# Patient Record
Sex: MALE | Race: WHITE | Employment: STUDENT | URBAN - METROPOLITAN AREA
[De-identification: names, ages, dates, MRNs, and addresses within clinical notes are randomized per-mention and may not be internally consistent; named-entity substitution may affect disease eponyms.]

---
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
## 2021-12-20 ENCOUNTER — TELEMEDICINE (OUTPATIENT)
Dept: FAMILY MEDICINE CLINIC | Facility: CLINIC | Age: 21
End: 2021-12-20

## 2021-12-20 DIAGNOSIS — Z53.29 NO-SHOW FOR APPOINTMENT: Primary | ICD-10-CM

## 2021-12-20 NOTE — PROGRESS NOTES
I was running late this morning. I texted a link to the phone number listed in his chart at 11:50 AM.  I did not get a connection. I now realized that the schedule has a different number, [phone].   I tried calling this number to get in touch with Diane Cunningham

## 2022-05-31 ENCOUNTER — EKG ENCOUNTER (OUTPATIENT)
Dept: LAB | Age: 22
End: 2022-05-31
Attending: INTERNAL MEDICINE
Payer: COMMERCIAL

## 2022-05-31 ENCOUNTER — LAB ENCOUNTER (OUTPATIENT)
Dept: LAB | Age: 22
End: 2022-05-31
Attending: INTERNAL MEDICINE
Payer: COMMERCIAL

## 2022-05-31 ENCOUNTER — OFFICE VISIT (OUTPATIENT)
Dept: FAMILY MEDICINE CLINIC | Facility: CLINIC | Age: 22
End: 2022-05-31
Payer: COMMERCIAL

## 2022-05-31 VITALS
WEIGHT: 184 LBS | HEIGHT: 70 IN | BODY MASS INDEX: 26.34 KG/M2 | OXYGEN SATURATION: 98 % | RESPIRATION RATE: 20 BRPM | DIASTOLIC BLOOD PRESSURE: 70 MMHG | SYSTOLIC BLOOD PRESSURE: 112 MMHG | HEART RATE: 72 BPM

## 2022-05-31 DIAGNOSIS — Z00.00 ROUTINE GENERAL MEDICAL EXAMINATION AT A HEALTH CARE FACILITY: ICD-10-CM

## 2022-05-31 DIAGNOSIS — Z00.00 ROUTINE GENERAL MEDICAL EXAMINATION AT A HEALTH CARE FACILITY: Primary | ICD-10-CM

## 2022-05-31 DIAGNOSIS — R53.83 DECREASED STAMINA: ICD-10-CM

## 2022-05-31 LAB
ALBUMIN SERPL-MCNC: 4.3 G/DL (ref 3.4–5)
ALBUMIN/GLOB SERPL: 1.1 {RATIO} (ref 1–2)
ALP LIVER SERPL-CCNC: 80 U/L
ALT SERPL-CCNC: 22 U/L
ANION GAP SERPL CALC-SCNC: 5 MMOL/L (ref 0–18)
AST SERPL-CCNC: 15 U/L (ref 15–37)
ATRIAL RATE: 68 BPM
BASOPHILS # BLD AUTO: 0.06 X10(3) UL (ref 0–0.2)
BASOPHILS NFR BLD AUTO: 0.7 %
BILIRUB SERPL-MCNC: 0.5 MG/DL (ref 0.1–2)
BUN BLD-MCNC: 13 MG/DL (ref 7–18)
CALCIUM BLD-MCNC: 8.9 MG/DL (ref 8.5–10.1)
CHLORIDE SERPL-SCNC: 107 MMOL/L (ref 98–112)
CHOLEST SERPL-MCNC: 182 MG/DL (ref ?–200)
CO2 SERPL-SCNC: 26 MMOL/L (ref 21–32)
CREAT BLD-MCNC: 0.96 MG/DL
EOSINOPHIL # BLD AUTO: 0.19 X10(3) UL (ref 0–0.7)
EOSINOPHIL NFR BLD AUTO: 2.2 %
ERYTHROCYTE [DISTWIDTH] IN BLOOD BY AUTOMATED COUNT: 12.6 %
FASTING PATIENT LIPID ANSWER: NO
FASTING STATUS PATIENT QL REPORTED: NO
GLOBULIN PLAS-MCNC: 3.9 G/DL (ref 2.8–4.4)
GLUCOSE BLD-MCNC: 86 MG/DL (ref 70–99)
HCT VFR BLD AUTO: 42.5 %
HDLC SERPL-MCNC: 54 MG/DL (ref 40–59)
HGB BLD-MCNC: 14.2 G/DL
IMM GRANULOCYTES # BLD AUTO: 0.02 X10(3) UL (ref 0–1)
IMM GRANULOCYTES NFR BLD: 0.2 %
IRON SATN MFR SERPL: 30 %
IRON SERPL-MCNC: 122 UG/DL
LDLC SERPL CALC-MCNC: 105 MG/DL (ref ?–100)
LYMPHOCYTES # BLD AUTO: 1.9 X10(3) UL (ref 1–4)
LYMPHOCYTES NFR BLD AUTO: 22 %
MCH RBC QN AUTO: 29.1 PG (ref 26–34)
MCHC RBC AUTO-ENTMCNC: 33.4 G/DL (ref 31–37)
MCV RBC AUTO: 87.1 FL
MONOCYTES # BLD AUTO: 0.68 X10(3) UL (ref 0.1–1)
MONOCYTES NFR BLD AUTO: 7.9 %
NEUTROPHILS # BLD AUTO: 5.78 X10 (3) UL (ref 1.5–7.7)
NEUTROPHILS # BLD AUTO: 5.78 X10(3) UL (ref 1.5–7.7)
NEUTROPHILS NFR BLD AUTO: 67 %
NONHDLC SERPL-MCNC: 128 MG/DL (ref ?–130)
OSMOLALITY SERPL CALC.SUM OF ELEC: 285 MOSM/KG (ref 275–295)
P AXIS: 22 DEGREES
P-R INTERVAL: 152 MS
PLATELET # BLD AUTO: 412 10(3)UL (ref 150–450)
POTASSIUM SERPL-SCNC: 3.7 MMOL/L (ref 3.5–5.1)
PROT SERPL-MCNC: 8.2 G/DL (ref 6.4–8.2)
Q-T INTERVAL: 372 MS
QRS DURATION: 86 MS
QTC CALCULATION (BEZET): 395 MS
R AXIS: 71 DEGREES
RBC # BLD AUTO: 4.88 X10(6)UL
SODIUM SERPL-SCNC: 138 MMOL/L (ref 136–145)
T AXIS: 49 DEGREES
TIBC SERPL-MCNC: 402 UG/DL (ref 240–450)
TRANSFERRIN SERPL-MCNC: 270 MG/DL (ref 200–360)
TRIGL SERPL-MCNC: 128 MG/DL (ref 30–149)
TSI SER-ACNC: 0.64 MIU/ML (ref 0.36–3.74)
VENTRICULAR RATE: 68 BPM
VLDLC SERPL CALC-MCNC: 22 MG/DL (ref 0–30)
WBC # BLD AUTO: 8.6 X10(3) UL (ref 4–11)

## 2022-05-31 PROCEDURE — 99385 PREV VISIT NEW AGE 18-39: CPT | Performed by: INTERNAL MEDICINE

## 2022-05-31 PROCEDURE — 80053 COMPREHEN METABOLIC PANEL: CPT

## 2022-05-31 PROCEDURE — 93010 ELECTROCARDIOGRAM REPORT: CPT | Performed by: INTERNAL MEDICINE

## 2022-05-31 PROCEDURE — 3074F SYST BP LT 130 MM HG: CPT | Performed by: INTERNAL MEDICINE

## 2022-05-31 PROCEDURE — 80061 LIPID PANEL: CPT

## 2022-05-31 PROCEDURE — 83550 IRON BINDING TEST: CPT

## 2022-05-31 PROCEDURE — 3008F BODY MASS INDEX DOCD: CPT | Performed by: INTERNAL MEDICINE

## 2022-05-31 PROCEDURE — 93005 ELECTROCARDIOGRAM TRACING: CPT

## 2022-05-31 PROCEDURE — 84443 ASSAY THYROID STIM HORMONE: CPT

## 2022-05-31 PROCEDURE — 36415 COLL VENOUS BLD VENIPUNCTURE: CPT

## 2022-05-31 PROCEDURE — 3078F DIAST BP <80 MM HG: CPT | Performed by: INTERNAL MEDICINE

## 2022-05-31 PROCEDURE — 83540 ASSAY OF IRON: CPT

## 2022-05-31 PROCEDURE — 85025 COMPLETE CBC W/AUTO DIFF WBC: CPT

## 2022-06-02 ENCOUNTER — TELEPHONE (OUTPATIENT)
Dept: FAMILY MEDICINE CLINIC | Facility: CLINIC | Age: 22
End: 2022-06-02

## 2023-12-12 PROBLEM — F33.0 MILD EPISODE OF RECURRENT MAJOR DEPRESSIVE DISORDER: Status: ACTIVE | Noted: 2023-12-12

## 2023-12-12 PROBLEM — F33.0 MILD EPISODE OF RECURRENT MAJOR DEPRESSIVE DISORDER (HCC): Status: ACTIVE | Noted: 2023-12-12

## 2024-02-23 DIAGNOSIS — F33.0 MILD EPISODE OF RECURRENT MAJOR DEPRESSIVE DISORDER (HCC): ICD-10-CM

## 2024-02-23 RX ORDER — ESCITALOPRAM OXALATE 10 MG/1
10 TABLET ORAL DAILY
Qty: 30 TABLET | Refills: 1 | Status: SHIPPED | OUTPATIENT
Start: 2024-02-23 | End: 2024-02-29 | Stop reason: DRUGHIGH

## 2024-02-23 NOTE — TELEPHONE ENCOUNTER
A refill request was received for:  Requested Prescriptions     Pending Prescriptions Disp Refills    ESCITALOPRAM 10 MG Oral Tab [Pharmacy Med Name: ESCITALOPRAM 10MG TABLETS] 30 tablet 1     Sig: TAKE 1 TABLET(10 MG) BY MOUTH DAILY       Last refill date: 12/12/2023      Last office visit:12/12/2023     Follow up due:  Future Appointments   Date Time Provider Department Center   2/29/2024  2:00 PM Jason Wisdom MD [facility] [address]

## 2024-06-03 ENCOUNTER — PATIENT MESSAGE (OUTPATIENT)
Dept: FAMILY MEDICINE CLINIC | Facility: CLINIC | Age: 24
End: 2024-06-03

## 2024-06-03 DIAGNOSIS — F33.0 MILD EPISODE OF RECURRENT MAJOR DEPRESSIVE DISORDER (HCC): ICD-10-CM

## 2024-06-03 RX ORDER — ESCITALOPRAM OXALATE 20 MG/1
20 TABLET ORAL DAILY
Qty: 30 TABLET | Refills: 2 | Status: SHIPPED | OUTPATIENT
Start: 2024-06-03

## 2025-05-08 ENCOUNTER — OFFICE VISIT (OUTPATIENT)
Dept: FAMILY MEDICINE CLINIC | Facility: CLINIC | Age: 25
End: 2025-05-08
Payer: COMMERCIAL

## 2025-05-08 VITALS
BODY MASS INDEX: 25.34 KG/M2 | DIASTOLIC BLOOD PRESSURE: 78 MMHG | HEART RATE: 81 BPM | WEIGHT: 177 LBS | SYSTOLIC BLOOD PRESSURE: 124 MMHG | HEIGHT: 70 IN | OXYGEN SATURATION: 99 % | RESPIRATION RATE: 16 BRPM

## 2025-05-08 DIAGNOSIS — F33.0 MILD EPISODE OF RECURRENT MAJOR DEPRESSIVE DISORDER: ICD-10-CM

## 2025-05-08 DIAGNOSIS — Z00.00 ROUTINE GENERAL MEDICAL EXAMINATION AT A HEALTH CARE FACILITY: Primary | ICD-10-CM

## 2025-05-08 DIAGNOSIS — F43.9 STRESS: ICD-10-CM

## 2025-05-08 DIAGNOSIS — F41.9 ANXIETY: ICD-10-CM

## 2025-05-08 PROCEDURE — 99395 PREV VISIT EST AGE 18-39: CPT | Performed by: INTERNAL MEDICINE

## 2025-05-08 RX ORDER — ESCITALOPRAM OXALATE 20 MG/1
20 TABLET ORAL DAILY
Qty: 30 TABLET | Refills: 3 | Status: SHIPPED | OUTPATIENT
Start: 2025-05-08

## 2025-05-08 NOTE — PROGRESS NOTES
The following individual(s) verbally consented to be recorded using ambient AI listening technology and understand that they can each withdraw their consent to this listening technology at any point by asking the clinician to turn off or pause the recording:    Patient name: Rogelio Snider    History of Present Illness  Rogelio Snider is a 24 year old male who presents for an annual physical exam.    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 He feels stressed due to his busy schedule but has not experienced depression recently. He had previously been on escitalopram for anxiety and depression but has waned off it. He is open to restarting it to help manage stress.    He experiences occasional burning during urination, which occurs sporadically and is sometimes associated with anxiety, especially in social settings. This issue has been present since college, and a previous specialist ruled out a urinary tract infection. Cranberry vitamins and Metamucil have been helpful in the past.    He experiences occasional constipation, which he attributes to anxiety and stress. Metamucil has been beneficial in managing this symptom.    He has a habit of tightening his lips, which he believes is stress-related, leading to occasional toothache.    No current sore throat, itchy eyes, or episodes of feeling lightheaded. No stomach aches or scrotal pain.      The 21st Century Cures Act makes medical notes like these available to patients in the interest of transparency. Please be advised this is a medical document. Medical documents are intended to carry relevant information, facts as evident, and the clinical opinion of the practitioner. The medical note is intended as peer to peer communication and may appear blunt or direct. It is written in medical language and may contain abbreviations or verbiage that  are unfamiliar.       Current Medications[1]   Past Medical History[2]   Past Surgical History[3]   Family History[4]   Social History:  Short Social Hx on File[5]   Occ: as above. : yes. Children: no.   Exercise: yes.  Diet: watches minimally     REVIEW OF SYSTEMS:   GENERAL: feels well otherwise  SKIN: denies any unusual skin lesions  EYES:denies blurred vision or double vision  HEENT: denies nasal congestion, sinus pain or ST, denies changes in hearing or vision  LUNGS: denies shortness of breath with exertion or frequent cough  CV: denies chest pain, pressure or palpitations  GI: denies abdominal pain,denies heartburn, denies chronic diarrhea or constipation  : denies nocturia or changes in stream, denies erectile dysfunction  MS: denies back pain, arthralgias or myalgias  NEURO: denies headaches or dizziness  PSYCH: as above; denies thoughts of harm to self  HEMATOLOGIC: denies hx of anemia, easy bruising or bleeding  ENDOCRINE:denies frequent thirst or urination, denies unintentional weight gain/loss  ALL/ASTHMA: denies hx of allergy or asthma    EXAM:   /78   Pulse 81   Resp 16   Ht 5' 10\" (1.778 m)   Wt 177 lb (80.3 kg)   SpO2 99%   BMI 25.40 kg/m²   Body mass index is 25.4 kg/m².     GENERAL: well developed, well nourished,in no apparent distress  SKIN: no rashes,no suspicious lesions  HEENT: atraumatic, normocephalic, PERRLA, conjunctiva clear, TMs clear, posterior pharynx clear, no congestion  NECK: supple,no adenopathy,no thyromegaly  LUNGS: clear to auscultation, easy breathing  CV: S1S2, RRR without murmur  GI: good BS's;no masses, HSM or tenderness  : two descended testes,no scrotal tenderness or mass, normal penis no lesions, no hernia  MS: Deandre, no bony deformities, gait normal  EXT: no cyanosis, clubbing or edema  NEURO: Oriented times three, strength 5/5 x 4 ext, LE DTRs 2+  PSYCH: mood and affect appropriate    ASSESSMENT AND PLAN:   1. Routine general medical examination  at a health care facility  - CBC With Differential With Platelet; Future  - Comp Metabolic Panel (14); Future  - Lipid Panel; Future  - TSH W Reflex To Free T4; Future    2. Mild episode of recurrent major depressive disorder  - trial of escitalopram, common SEs discussed  - escitalopram 20 MG Oral Tab; Take 1 tablet (20 mg total) by mouth daily.  Dispense: 30 tablet; Refill: 3    3. Stress  - escitalopram 20 MG Oral Tab; Take 1 tablet (20 mg total) by mouth daily.  Dispense: 30 tablet; Refill: 3  - [facility] Referral - In Network    4. Anxiety  - reassures of no harm to self  - escitalopram 20 MG Oral Tab; Take 1 tablet (20 mg total) by mouth daily.  Dispense: 30 tablet; Refill: 3  - [facility] Referral - In Network        The patient indicates understanding of these issues and agrees to the plan.  Follow up in 1- 2months, sooner if needed    Orders Placed This Encounter   Procedures    CBC With Differential With Platelet     Standing Status:   Future     Number of Occurrences:   1     Expected Date:   5/8/2025     Expiration Date:   5/8/2026    Comp Metabolic Panel (14)     Standing Status:   Future     Number of Occurrences:   1     Expected Date:   5/8/2025     Expiration Date:   5/8/2026    Lipid Panel     Standing Status:   Future     Number of Occurrences:   1     Expected Date:   5/8/2025     Expiration Date:   5/9/2026    TSH W Reflex To Free T4     Standing Status:   Future     Number of Occurrences:   1     Expected Date:   5/8/2025     Expiration Date:   5/8/2026            [1]   Current Outpatient Medications   Medication Sig Dispense Refill    escitalopram 20 MG Oral Tab Take 1 tablet (20 mg total) by mouth daily. 30 tablet 3   [2]   Past Medical History:   Asperger syndrome (HCC)    Chronic rhinitis    Tear of right glenoid labrum   [3]   Past Surgical History:  Procedure Laterality Date    Arthroscopy, shoulder, surgical; capsulorrhaphy Right 03/12/2015    Procedure: ARTHROSCOPY SHOULDER;  Surgeon:  Jason West MD;  Location: [facility]   [4]   Family History  Problem Relation Age of Onset    Anxiety Mother     Anxiety Father     Depression Father     Other (htn) Father         on a blood thinner    Other (mild high cholesterol) Sister     Other (aneurysm) Maternal Grandmother     Other (brain tumor) Maternal Grandfather     Diabetes Paternal Grandmother     Diabetes Paternal Grandfather     Cancer Neg     Heart Disease Neg     Stroke Neg    [5]   Social History  Socioeconomic History    Marital status: Single   Tobacco Use    Smoking status: Never    Smokeless tobacco: Never   Substance and Sexual Activity    Alcohol use: No    Drug use: No

## 2025-05-08 NOTE — PROGRESS NOTES
The 21st Century Cures Act makes medical notes like these available to patients in the interest of transparency. Please be advised this is a medical document. Medical documents are intended to carry relevant information, facts as evident, and the clinical opinion of the practitioner. The medical note is intended as peer to peer communication and may appear blunt or direct. It is written in medical language and may contain abbreviations or verbiage that are unfamiliar.       Rogelio Snider is a 24 year old male who presents for a complete physical exam.   HPI:   Pt complains of ***.       Current Medications[1]   Past Medical History[2]   Past Surgical History[3]   Family History[4]   Social History:  Short Social Hx on File[5]   Occ: ***. : ***. Children: ***.   Exercise: {MPNEXERCISE:85749::\"none\"}.  Diet: {MPNDIET:60999::\"doesn't watch\"}     REVIEW OF SYSTEMS:   GENERAL: feels well otherwise  SKIN: denies any unusual skin lesions  EYES:denies blurred vision or double vision  HEENT: denies nasal congestion, sinus pain or ST, denies changes in hearing or vision  LUNGS: denies shortness of breath with exertion or frequent cough  CV: denies chest pain, pressure or palpitations  GI: denies abdominal pain,denies heartburn, denies chronic diarrhea or constipation  : denies nocturia or changes in stream, denies erectile dysfunction  MS: denies back pain, arthralgias or myalgias  NEURO: denies headaches or dizziness  PSYCH: denies depression or anxiety  HEMATOLOGIC: denies hx of anemia, easy bruising or bleeding  ENDOCRINE:denies frequent thirst or urination, denies unintentional weight gain/loss  ALL/ASTHMA: denies hx of allergy or asthma    EXAM:   There were no vitals taken for this visit.  There is no height or weight on file to calculate BMI.     GENERAL: well developed, well nourished,in no apparent distress  SKIN: no rashes,no suspicious lesions  HEENT: atraumatic, normocephalic, PERRLA, conjunctiva  clear, TMs clear, posterior pharynx clear, no congestion  NECK: supple,no adenopathy,no thyromegaly  LUNGS: clear to auscultation, easy breathing  CV: S1S2, RRR without murmur  GI: good BS's;no masses, HSM or tenderness  : two descended testes,no scrotal tenderness or mass, normal penis no lesions, no hernia  MS: Deandre, no bony deformities, gait normal  EXT: no cyanosis, clubbing or edema  NEURO: Oriented times three, strength 5/5 x 4 ext, LE DTRs 2+  PSYCH: mood and affect appropriate    ASSESSMENT AND PLAN:        The patient indicates understanding of these issues and agrees to the plan.  Follow up in ***1 year.    No orders of the defined types were placed in this encounter.           [1]   Current Outpatient Medications   Medication Sig Dispense Refill    escitalopram 20 MG Oral Tab Take 1 tablet (20 mg total) by mouth daily. 30 tablet 2   [2]   Past Medical History:   Asperger syndrome (HCC)    Chronic rhinitis    Tear of right glenoid labrum   [3]   Past Surgical History:  Procedure Laterality Date    Arthroscopy, shoulder, surgical; capsulorrhaphy Right 03/12/2015    Procedure: ARTHROSCOPY SHOULDER;  Surgeon: Jason West MD;  Location: [facility]   [4]   Family History  Problem Relation Age of Onset    Anxiety Father     Depression Father     Anxiety Mother     Other (aneurysm) Maternal Grandmother     Other (brain tumor) Maternal Grandfather     Diabetes Paternal Grandmother     Diabetes Paternal Grandfather     Cancer Neg     Heart Disease Neg     Stroke Neg    [5]   Social History  Socioeconomic History    Marital status: Single   Tobacco Use    Smoking status: Never    Smokeless tobacco: Never   Substance and Sexual Activity    Alcohol use: No    Drug use: No

## 2025-05-10 ENCOUNTER — LAB ENCOUNTER (OUTPATIENT)
Dept: LAB | Age: 25
End: 2025-05-10
Attending: INTERNAL MEDICINE
Payer: COMMERCIAL

## 2025-05-10 DIAGNOSIS — Z00.00 ROUTINE GENERAL MEDICAL EXAMINATION AT A HEALTH CARE FACILITY: ICD-10-CM

## 2025-05-10 LAB
ALBUMIN SERPL-MCNC: 5 G/DL (ref 3.2–4.8)
ALBUMIN/GLOB SERPL: 1.9 {RATIO} (ref 1–2)
ALP LIVER SERPL-CCNC: 70 U/L (ref 45–117)
ALT SERPL-CCNC: 21 U/L (ref 10–49)
ANION GAP SERPL CALC-SCNC: 8 MMOL/L (ref 0–18)
AST SERPL-CCNC: 19 U/L (ref ?–34)
BASOPHILS # BLD AUTO: 0.07 X10(3) UL (ref 0–0.2)
BASOPHILS NFR BLD AUTO: 1 %
BILIRUB SERPL-MCNC: 0.3 MG/DL (ref 0.3–1.2)
BUN BLD-MCNC: 18 MG/DL (ref 9–23)
CALCIUM BLD-MCNC: 10.2 MG/DL (ref 8.7–10.6)
CHLORIDE SERPL-SCNC: 105 MMOL/L (ref 98–112)
CHOLEST SERPL-MCNC: 168 MG/DL (ref ?–200)
CO2 SERPL-SCNC: 28 MMOL/L (ref 21–32)
CREAT BLD-MCNC: 0.87 MG/DL (ref 0.7–1.3)
EGFRCR SERPLBLD CKD-EPI 2021: 122 ML/MIN/1.73M2 (ref 60–?)
EOSINOPHIL # BLD AUTO: 0.39 X10(3) UL (ref 0–0.7)
EOSINOPHIL NFR BLD AUTO: 5.3 %
ERYTHROCYTE [DISTWIDTH] IN BLOOD BY AUTOMATED COUNT: 12.9 %
FASTING PATIENT LIPID ANSWER: YES
FASTING STATUS PATIENT QL REPORTED: YES
GLOBULIN PLAS-MCNC: 2.7 G/DL (ref 2–3.5)
GLUCOSE BLD-MCNC: 99 MG/DL (ref 70–99)
HCT VFR BLD AUTO: 43.2 % (ref 39–53)
HDLC SERPL-MCNC: 53 MG/DL (ref 40–59)
HGB BLD-MCNC: 14.4 G/DL (ref 13–17.5)
IMM GRANULOCYTES # BLD AUTO: 0.02 X10(3) UL (ref 0–1)
IMM GRANULOCYTES NFR BLD: 0.3 %
LDLC SERPL CALC-MCNC: 97 MG/DL (ref ?–100)
LYMPHOCYTES # BLD AUTO: 1.96 X10(3) UL (ref 1–4)
LYMPHOCYTES NFR BLD AUTO: 26.9 %
MCH RBC QN AUTO: 28.7 PG (ref 26–34)
MCHC RBC AUTO-ENTMCNC: 33.3 G/DL (ref 31–37)
MCV RBC AUTO: 86.2 FL (ref 80–100)
MONOCYTES # BLD AUTO: 0.62 X10(3) UL (ref 0.1–1)
MONOCYTES NFR BLD AUTO: 8.5 %
NEUTROPHILS # BLD AUTO: 4.23 X10 (3) UL (ref 1.5–7.7)
NEUTROPHILS # BLD AUTO: 4.23 X10(3) UL (ref 1.5–7.7)
NEUTROPHILS NFR BLD AUTO: 58 %
NONHDLC SERPL-MCNC: 115 MG/DL (ref ?–130)
OSMOLALITY SERPL CALC.SUM OF ELEC: 294 MOSM/KG (ref 275–295)
PLATELET # BLD AUTO: 423 10(3)UL (ref 150–450)
POTASSIUM SERPL-SCNC: 4.4 MMOL/L (ref 3.5–5.1)
PROT SERPL-MCNC: 7.7 G/DL (ref 5.7–8.2)
RBC # BLD AUTO: 5.01 X10(6)UL (ref 4.3–5.7)
SODIUM SERPL-SCNC: 141 MMOL/L (ref 136–145)
TRIGL SERPL-MCNC: 97 MG/DL (ref 30–149)
TSI SER-ACNC: 1.17 UIU/ML (ref 0.55–4.78)
VLDLC SERPL CALC-MCNC: 16 MG/DL (ref 0–30)
WBC # BLD AUTO: 7.3 X10(3) UL (ref 4–11)

## 2025-05-10 PROCEDURE — 85025 COMPLETE CBC W/AUTO DIFF WBC: CPT

## 2025-05-10 PROCEDURE — 80053 COMPREHEN METABOLIC PANEL: CPT

## 2025-05-10 PROCEDURE — 84443 ASSAY THYROID STIM HORMONE: CPT

## 2025-05-10 PROCEDURE — 80061 LIPID PANEL: CPT

## 2025-05-10 PROCEDURE — 36415 COLL VENOUS BLD VENIPUNCTURE: CPT

## 2025-06-18 ENCOUNTER — TELEPHONE (OUTPATIENT)
Age: 25
End: 2025-06-18

## 2025-06-18 ENCOUNTER — PATIENT MESSAGE (OUTPATIENT)
Age: 25
End: 2025-06-18

## 2025-06-18 NOTE — TELEPHONE ENCOUNTER
Aurelia Bryant PsyD  [facility]  [address] [facility], [place], [address]  Phone: [phone]  [email or web address]     Yesi Horowitz LCSW  [facility]   [address], [place], [address]  Phone: [phone]  [email or web address]     Delroy Parikh LCPC  [facility]  [address], [address], [place], [address]  Phone: [phone]  [email or web address]     Adam Baptiste LPC  [facility]  [address], [address], [place], [address]  Phone: [phone]  [email or web address]

## (undated) NOTE — ED AVS SNAPSHOT
THE [facility] in [facility] [address] [address]    Phone:  [phone]    Fax:  [address]   MRN: [id number]    Department:  THE [facility] in [place]   Date of Visit:  2/14/2017           D Where to Get Your Medications      These medications were sent to [facility] [address], [address] AT [facility] [address], [phone], [address] [facility] [address]     Phone:  [phone] - amoxic from our patient liason soon after your visit. Also, some patients receive a detailed feedback survey mailed to them a week after the visit. If you receive this, we would really appreciate it if you could take the time to complete it. Thank you!       [facility] ([address]) [facility] [place] [address] [facility]. ([address]) [facility] [address] ([place]) [address]  [address] ([facility] [address] Support Staff. Remember, MyChart is NOT to be used for urgent needs. For medical emergencies, dial 911.

## (undated) NOTE — LETTER
[facility] IN [place]  [address] [id number]  Dept: [phone]  Dept Fax: [phone]      February 14, 2017    Patient: Rocio Elroy   Date of Visit: 2/14/2017       To Whom It May Concern:    Marissa Giordano was seen and